# Patient Record
Sex: FEMALE | Race: WHITE
[De-identification: names, ages, dates, MRNs, and addresses within clinical notes are randomized per-mention and may not be internally consistent; named-entity substitution may affect disease eponyms.]

---

## 2017-08-19 ENCOUNTER — HEALTH MAINTENANCE LETTER (OUTPATIENT)
Age: 37
End: 2017-08-19

## 2019-11-06 ENCOUNTER — HEALTH MAINTENANCE LETTER (OUTPATIENT)
Age: 39
End: 2019-11-06

## 2020-04-23 ENCOUNTER — TELEPHONE (OUTPATIENT)
Dept: FAMILY MEDICINE | Facility: CLINIC | Age: 40
End: 2020-04-23

## 2020-04-23 ENCOUNTER — APPOINTMENT (OUTPATIENT)
Dept: LAB | Facility: URGENT CARE | Age: 40
End: 2020-04-23

## 2020-04-23 DIAGNOSIS — Z01.84 IMMUNITY STATUS TESTING: ICD-10-CM

## 2020-04-23 DIAGNOSIS — Z01.84 IMMUNITY STATUS TESTING: Primary | ICD-10-CM

## 2020-04-23 PROCEDURE — 36415 COLL VENOUS BLD VENIPUNCTURE: CPT | Performed by: FAMILY MEDICINE

## 2020-04-23 PROCEDURE — 86769 SARS-COV-2 COVID-19 ANTIBODY: CPT | Performed by: FAMILY MEDICINE

## 2020-04-23 NOTE — TELEPHONE ENCOUNTER
"COVID-19 Antibody Testing     Screening Questions  • Has it been at least ten days after initial onset of symptoms (cough, fever, shortness of breath): yes. Cough/HA, chest tightness  • Has it been at least 10 days after initial known exposure: yes  •   Information for Patient  • If both of the screening questions are answered \"no\" and patient still wishes to proceed with test, advise that this will affect the accuracy of the results of their test.  • The test will be sent to [facility] and we will receive the results in 3-5 days.  • Results will be released to [employer] or mailed to you if you do not have a [employer] account.  • Because research is still ongoing, we will not be able to provide interpretation of your results.   • The cost of the test is $100 and we will bill this to your insurance company. If they do not pay for this study you will be responsible for the cost. You will be asked to sign a waiver at the time of specimen collection.  • Please bring your insurance card and photo identification with you to your lab visit.  + cough/HA, chest tightness the end of March. Antibody testing 4/23/20 [facility]              "

## 2020-04-26 LAB
SARS-COV-2 IGG SERPL IA-ACNC: <1.01
SARS-COV-2 IGG SERPL QL IA: NEGATIVE

## 2020-11-29 ENCOUNTER — HEALTH MAINTENANCE LETTER (OUTPATIENT)
Age: 40
End: 2020-11-29

## 2021-03-03 DIAGNOSIS — Z23 HIGH PRIORITY FOR 2019-NCOV VACCINE: ICD-10-CM

## 2021-03-17 ENCOUNTER — CLINICAL SUPPORT (OUTPATIENT)
Dept: FAMILY MEDICINE | Facility: CLINIC | Age: 41
End: 2021-03-17

## 2021-03-17 DIAGNOSIS — Z23 ENCOUNTER FOR VACCINATION: Primary | ICD-10-CM

## 2021-08-31 RX ORDER — MONTELUKAST SODIUM 10 MG/1
TABLET ORAL
Qty: 90 TABLET | Refills: 3 | OUTPATIENT
Start: 2021-08-31

## 2021-09-19 ENCOUNTER — HEALTH MAINTENANCE LETTER (OUTPATIENT)
Age: 41
End: 2021-09-19

## 2021-11-29 ENCOUNTER — HOSPITAL ENCOUNTER (OUTPATIENT)
Dept: PHYSICAL THERAPY | Facility: HOSPITAL | Age: 41
Setting detail: THERAPIES SERIES
Discharge: STILL ADMITTED OR ADMINISTRATIVE | End: 2021-11-29
Payer: COMMERCIAL

## 2021-11-29 DIAGNOSIS — M25.551 RIGHT HIP PAIN: Primary | ICD-10-CM

## 2021-11-29 PROCEDURE — 97161 PT EVAL LOW COMPLEX 20 MIN: CPT | Mod: GP | Performed by: PHYSICAL THERAPIST

## 2021-11-29 PROCEDURE — 97110 THERAPEUTIC EXERCISES: CPT | Mod: GP | Performed by: PHYSICAL THERAPIST

## 2021-11-29 PROCEDURE — 97140 MANUAL THERAPY 1/> REGIONS: CPT | Mod: GP | Performed by: PHYSICAL THERAPIST

## 2021-11-29 NOTE — INTERDISCIPLINARY/THERAPY
[facility]  [address] [place] [address]  Dept: [phone]    Physical Therapy Initial Evaluation     Patient Name: Cadence Navarrete  Referring Doctor: Aggie Arguello PT  Date of Service: 11/29/2021   Certification Period: through 2/27/22  [id number]    Primary Medical Diagnosis: Right hip pain [M25.551]     Treatment Diagnosis.   Impaired Joint Mobility, Motor Function, Muscle Performance, and Range of Motion Associated with Connective Tissue Dysfunction    Visit Number: 1    Subjective:  Patient is a 41-year-old female presenting with complaints of right hip pain for approximately 2 years.  Patient repeats that she had no specific injury but thinks it is mostly due to overuse.  Patient states that she has been to multiple physical therapists with no improvement in symptoms.  She states she currently has no pain when running or walking but has increased pain and tightness afterwards as well as difficulty sleeping.  Patient has had a negative x-ray but no MRI.  She is currently not doing any specific strengthening or stretching exercises other than some stretching following running.  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  She has not had any pain in this area since.  Patient desires to continue to run and stay active.  She desires to begin physical therapy in order to decrease pain            History reviewed. No pertinent past medical history.      Current Outpatient Medications:   •  albuterol HFA (PROVENTIL HFA;VENTOLIN HFA) 90 mcg/actuation inhaler, Inhale 2 puffs every 4 hours by inhalation route as needed., Disp: , Rfl:     Aspirin and Nsaids (non-steroidal anti-inflammatory drug)    Social History     Socioeconomic History   • Marital status:      Spouse name: Not on file   • Number of children: Not on file   • Years of education: Not on file   • Highest  education level: Not on file   Occupational History   • Not on file   Tobacco Use   • Smoking status: Not on file   • Smokeless tobacco: Not on file   Substance and Sexual Activity   • Alcohol use: Not on file   • Drug use: Not on file   • Sexual activity: Not on file   Other Topics Concern   • Not on file   Social History Narrative   • Not on file     Social Determinants of Health     Financial Resource Strain: Not on file   Food Insecurity: Not on file   Transportation Needs: Not on file   Physical Activity: Not on file   Stress: Not on file   Social Connections: Not on file   Intimate Partner Violence: Not on file   Housing Stability: Not on file               Moreau Fall Risk  History of Falling: No  Secondary Diagnosis: No  Ambulatory Aids: None/bedrest/nurse assist  Intravenous Therapy/Heparin/Saline Lock: No  Gait/Transferring: Normal/bedrest/wheelchair  Mental Status: Oriented to own ability  Moreau Fall Risk Score: 0  Moreau Fall Risk Score: 0  Fall Risk Interventions: none    Pain:  Pain Assessment Scale  Pain Scale: 0-10  0-10 Pain Score: 0 - No pain    Activities limited by Patient's complaint:   1.  Running  2.  Prolonged hiking      Objective:     Observation:    Patient is a healthy-appearing 41-year-old female no apparent distress    Gait:   Patient ambulates with no significant gait deviations.  However, she does have decreased lumbar/pelvic rotation    Posture:    Increased lumbar lordosis    ROM:   Standing lumbar active range of motion:  Flexion: To ankles without pain in  Extension: Within normal limits without pain  Side bending: Within normal limits, slight discomfort with left side bending  Rotation: Decreased left rotation greater than right without pain    Strength Testing:    Hip flexion: 4+/5 bilaterally  Hip abduction: 4+/5 bilaterally  Hip internal rotation: Left 5/5 right 4/5  Hip external rotation: Left 5/5 right 4+/5  Hamstrings: 5/5 bilaterally  Quadriceps: 5/5 bilaterally    Joint  Mobility:   Decreased right hip PA and AP joint mobility  Decreased L4-L5 rotational facet joint mobility    Flexibility:   Decreased bilateral quad flexibility right greater than left  Decreased bilateral hip flexor flexibility right greater than left    Special Tests:   SI joint cluster testing:  Heather test, SI joint compression test, Gaenslen's test, sacral thrust all negative bilaterally  Positive FADIR right  Negative scour test bilaterally    Neurological:   Light touch sensation intact and symmetrical    Palpation:    Significant tenderness to palpation of right gluteus medius, minimus, piriformis, QL, lumbar paraspinals, TFL, ITB    Pelvic position: Right anterior innominate rotation left on left sacral torsion    Functional Assessment:    Single leg stance: Able to perform 20 seconds bilaterally without hip drop or loss of balance      Education:    Patient was educated on physical therapy plan of care and goals agreed upon them  Patient was given home exercise program via [employer] including:  Access Code: [email or web address]  URL: [email or web address]  Date: 11/29/2021  Prepared by: Aggie Arguello    Exercises  Supine Lower Trunk Rotation - 1 x daily - 7 x weekly - 2 sets - 10 reps  Sidelying Thoracic Rotation with Open Book - 1 x daily - 7 x weekly - 2 sets - 10 reps  Child's Pose with Sidebending - 1 x daily - 7 x weekly - 2 sets - 30 sec hold  Supine Pelvic Floor Stretch - 1 x daily - 7 x weekly - 2 sets - 30 sec hold  Standing Quadriceps Stretch - 1 x daily - 7 x weekly - 2 sets - 30sec hold  Hip Flexor Stretch with Chair - 1 x daily - 7 x weekly - 2 sets - 10 reps - 30 sec hold  Standing Lumbar Rotation Stretch - 1 x daily - 7 x weekly - 2 sets - 10 reps  Seated Piriformis Stretch - 2 x daily - 7 x weekly - 2 sets - 30 hold             Time Calculation  Start Time: 0815  Stop Time: 0900  Time Calculation (min): 45 min     Therapeutic Interventions (Time Spent in Minutes)  Manual Therapy:  10  Therapeutic Exercise: 15     PT Untimed Charges - Quick List (Time Spent in Minutes)  PT Eval Low Complexity: 20       Initial Treatment:   Evaluation  Manual therapy:  MET for correction of R anterior innominate rotation and L on L sacral torsion  Therapeutic exercise:  Demonstrated and performed HEP as seen above.   Gave handout for [facility]    Rehab Goals/Potential/Assessment/Plan:  Patient is a 41-year-old female presenting with signs and symptoms of right hip pain associated with limited lumbar and hip mobility as well as increased muscle tension and decreased lower extremity flexibility and strength.  Patient will benefit from skilled therapy in order to improve these deficits and return to prior level of function without pain.    Short-Term Goals:      Short Term PT Goal 2: Patient will be able to hike desired distance without increase in hip pain within 4 weeks.      Long-Term Goals:    Long Term PT Goal 1: Patient will demonstrate [place] and compliance with home exercise program within 8-12 weeks.         Long Term PT Goal 2: Patient will be able to run desired amount without increase in hip pain within 12 weeks.         Long Term PT Goal 3: Patient will demonstrate 5/5 manual muscle tests in bilateral lower extremities to improve hip stability and strength within 12 weeks             Prognosis  Assessment: Decreased endurance,Decreased LE strength,Decreased LE ROM  Prognosis: Good    Plan  Treatment Interventions: Therapeutic activities,Therapeutic exercises,Manual therapy,Modalities,Neuromuscular re-education,Pain education/TNE,Balance training  PT Frequency: 1-2x/wk  Treatment Duration : 12 weeks        Thank you for allowing us to share in the care of this patient.  If you have any questions, recommendations, or further concerns regarding this patient, please feel free to contact me.  Signed by: Aggie Arguello, PT  11/29/2021  4:09 PM    * I have reviewed the plan of care and certify a  continuing need for medically necessary services

## 2021-12-06 ENCOUNTER — HOSPITAL ENCOUNTER (OUTPATIENT)
Dept: PHYSICAL THERAPY | Facility: HOSPITAL | Age: 41
Setting detail: THERAPIES SERIES
Discharge: STILL ADMITTED OR ADMINISTRATIVE | End: 2021-12-06
Payer: COMMERCIAL

## 2021-12-06 PROCEDURE — 20560 NDL INSJ W/O NJX 1 OR 2 MUSC: CPT | Performed by: PHYSICAL THERAPIST

## 2021-12-06 PROCEDURE — 97032 APPL MODALITY 1+ESTIM EA 15: CPT | Mod: GP | Performed by: PHYSICAL THERAPIST

## 2021-12-06 PROCEDURE — 97110 THERAPEUTIC EXERCISES: CPT | Mod: GP | Performed by: PHYSICAL THERAPIST

## 2021-12-06 NOTE — INTERDISCIPLINARY/THERAPY
[address] [facility]  [place] [id number] [phone]      Physical Therapy Daily Treatment Note       Date of Service: 12/06/21    Patient Name: Cadence Navarrete  Referring Provider: Aggie Arguello, PT  Visit Number: 2           SUBJECTIVE:  Pt states that she was feeling pretty good but started having pain after she ran.  Her pain was mostly in her right glute and hip area.      Has patient fallen since last visit? No  FALL RISK Interventions:none    Have there been any changes in medications? No      Pain:   Pain Assessment Scale  Pain Scale: 0-10  0-10 Pain Score: 3  Pain Type: Acute pain  Pain Location: Hip  Pain Orientation: Right  Pain Descriptors: Aching  Pain Frequency: Intermittent  .  OBJECTIVE:    Verbal consent was obtained from the patient.  Written consent was obtained from the patient.  Risks, benefits, and alternatives were discussed.  The patient states understanding of the procedure being performed.  Patient ID confirmed verbally.      Dry Needling x 15min    Position: prone  Needles: 3; lumbar multifidus and gluteus medius  Technique: Direct    Electrical Stimulation - Attended x 10 min  Point Stimulator:  Intra-muscular, Inter-muscular    Manual therapy:  - STM in prone to right lumbar paraspinals, and right glute musculature    Therapeutic exercises:  Reviewed and performed HEP below:    Access Code: [email or web address]    Exercises  Supine Lower Trunk Rotation - 1 x daily - 7 x weekly - 2 sets - 10 reps  Sidelying Thoracic Rotation with Open Book - 1 x daily - 7 x weekly - 2 sets - 10 reps  Child's Pose with Sidebending - 1 x daily - 7 x weekly - 2 sets - 30 sec hold  Supine Pelvic Floor Stretch - 1 x daily - 7 x weekly - 2 sets - 30 sec hold  Standing Quadriceps Stretch - 1 x daily - 7 x weekly - 2 sets - 30sec hold  Hip Flexor Stretch with Chair - 1 x daily - 7 x weekly - 2 sets - 10 reps - 30 sec hold  Standing Lumbar Rotation Stretch - 1 x daily - 7 x weekly - 2 sets - 10 reps  Seated  Piriformis Stretch - 2 x daily - 7 x weekly - 2 sets - 30 hold     Time Calculation  Start Time: 0935  Stop Time: 1015  Time Calculation (min): 40 min    ASSESSMENT:  Pt had trigger points in her right lumbar and glute musculature.  She is performing her exercises and feels good after performing them but continues to have right hip pain after running.      PLAN FOR NEXT TREAMENT SESSION:  - Begin hip/core strengthening exercises.   - Continue manual therapy and/or dry needling to decrease muscle tension and trigger points.     Thank you for allowing us to share in the care of this patient. If you have any questions, recommendations, or further concerns regarding this patient, please feel free to contact me at [address] [facility] [address] [phone]  .  Signed by: Aggie Arguello, PT  12/6/2021  10:13 AM

## 2021-12-17 ENCOUNTER — HOSPITAL ENCOUNTER (OUTPATIENT)
Dept: PHYSICAL THERAPY | Facility: HOSPITAL | Age: 41
Setting detail: THERAPIES SERIES
Discharge: STILL ADMITTED OR ADMINISTRATIVE | End: 2021-12-17
Payer: COMMERCIAL

## 2021-12-17 PROCEDURE — 97112 NEUROMUSCULAR REEDUCATION: CPT | Mod: GP | Performed by: PHYSICAL THERAPIST

## 2021-12-17 PROCEDURE — 97032 APPL MODALITY 1+ESTIM EA 15: CPT | Mod: GP | Performed by: PHYSICAL THERAPIST

## 2021-12-17 NOTE — INTERDISCIPLINARY/THERAPY
[address] [facility]  [place] [facility] [id number] [phone]      Physical Therapy Daily Treatment Note       Date of Service: 12/17/21    Patient Name: Cadence Navarrete  Referring Provider: Aggie Arguello, PT  Visit Number: 3           SUBJECTIVE:  Pt states that she didn't do her exercises while she was traveling     Has patient fallen since last visit? No  FALL RISK Interventions:none    Have there been any changes in medications? No      Pain:   Pain Assessment Scale  Pain Scale: 0-10  0-10 Pain Score: 2  Pain Type: Acute pain  Pain Location: Hip  Pain Orientation: Right  Pain Descriptors: Aching  Pain Frequency: Intermittent  .  OBJECTIVE:    Verbal consent was obtained from the patient.  Written consent was obtained from the patient.  Risks, benefits, and alternatives were discussed.  The patient states understanding of the procedure being performed.  Patient ID confirmed verbally.      Dry Needling x 15min    Position: prone  Needles: 4; lumbar multifidus and gluteus medius  Technique: Direct    Electrical Stimulation - Attended x 10 min  Point Stimulator:  Intra-muscular, Inter-muscular    Manual therapy:  - STM in prone to right lumbar paraspinals, and right glute musculature    Therapeutic exercises:  Reviewed and performed HEP below:    Neuromuscular re-education:  - Diaphragm breathing for pelvic floor muscle relaxation:  - Diaphragm breathing with pelvic floor and TA activation (pelvic brace)  - supine pelvic brace with fall out (YTB)  - supine pelvic brace with march (YTB)    Access Code: [email or web address]  URL: [email or web address]  Date: 12/17/2021  Prepared by: Aggie Arguello    Exercises  Supine Lower Trunk Rotation - 1 x daily - 7 x weekly - 2 sets - 10 reps  Sidelying Thoracic Rotation with Open Book - 1 x daily - 7 x weekly - 2 sets - 10 reps  Child's Pose with Sidebending - 1 x daily - 7 x weekly - 2 sets - 30 sec hold  Supine Pelvic Floor Stretch - 1 x daily - 7 x weekly - 2 sets - 30  sec hold  Standing Quadriceps Stretch - 1 x daily - 7 x weekly - 2 sets - 30sec hold  Hip Flexor Stretch with Chair - 1 x daily - 7 x weekly - 2 sets - 10 reps - 30 sec hold  Standing Lumbar Rotation Stretch - 1 x daily - 7 x weekly - 2 sets - 10 reps  Seated Piriformis Stretch - 2 x daily - 7 x weekly - 2 sets - 30 hold  Supine March - 1 x daily - 7 x weekly - 10 reps - 2 sets  Hooklying Single Leg Bent Knee Fallouts with Resistance - 1 x daily - 7 x weekly - 10 reps - 2 sets  Bridge with Hip Abduction and Resistance - Ground Touches - 1 x daily - 7 x weekly - 10 reps - 2 sets     Time Calculation  Start Time: 0735  Stop Time: 0815  Time Calculation (min): 40 min    ASSESSMENT:  Pt had very good twitch response today with dry needling to her lumbar and glute musculature.  Pt verbalized feeling her deep core muscles during the electrical stimulation but no significant pain.  She had good understanding of deep core activation following needling today and will benefit from performing initial deep core exercises to improve pelvic, hip and spinal mobility.        PLAN FOR NEXT TREAMENT SESSION:  - Progress hip/core strengthening exercises.   - Continue manual therapy and/or dry needling to decrease muscle tension and trigger points.     Thank you for allowing us to share in the care of this patient. If you have any questions, recommendations, or further concerns regarding this patient, please feel free to contact me at [address] [facility] [address] [phone]  .  Signed by: Aggie Arguello, PT  12/17/2021  8:24 AM

## 2021-12-20 ENCOUNTER — OFFICE VISIT (OUTPATIENT)
Dept: FAMILY MEDICINE | Facility: CLINIC | Age: 41
End: 2021-12-20
Payer: COMMERCIAL

## 2021-12-20 VITALS
OXYGEN SATURATION: 96 % | RESPIRATION RATE: 18 BRPM | SYSTOLIC BLOOD PRESSURE: 126 MMHG | WEIGHT: 184 LBS | HEIGHT: 64 IN | BODY MASS INDEX: 31.41 KG/M2 | TEMPERATURE: 99.1 F | HEART RATE: 121 BPM | DIASTOLIC BLOOD PRESSURE: 80 MMHG

## 2021-12-20 DIAGNOSIS — R05.9 COUGH: Primary | ICD-10-CM

## 2021-12-20 DIAGNOSIS — J10.1 INFLUENZA A: ICD-10-CM

## 2021-12-20 LAB
FLUAV RNA NPH QL NAA+NON-PROBE: POSITIVE
FLUBV RNA NPH QL NAA+NON-PROBE: NEGATIVE
GP B STREP AG SPEC QL: NEGATIVE
RSV RNA NPH QL NAA+NON-PROBE: NEGATIVE
SARS-COV-2 RNA RESP QL NAA+PROBE: NEGATIVE

## 2021-12-20 PROCEDURE — 87637 SARSCOV2&INF A&B&RSV AMP PRB: CPT | Performed by: FAMILY MEDICINE

## 2021-12-20 PROCEDURE — 87070 CULTURE OTHR SPECIMN AEROBIC: CPT | Performed by: FAMILY MEDICINE

## 2021-12-20 PROCEDURE — 99213 OFFICE O/P EST LOW 20 MIN: CPT

## 2021-12-20 PROCEDURE — 87880 STREP A ASSAY W/OPTIC: CPT | Mod: QW | Performed by: FAMILY MEDICINE

## 2021-12-20 RX ORDER — LORATADINE 10 MG/1
TABLET ORAL EVERY 24 HOURS
COMMUNITY

## 2021-12-20 RX ORDER — OSELTAMIVIR PHOSPHATE 75 MG/1
75 CAPSULE ORAL 2 TIMES DAILY
Qty: 10 CAPSULE | Refills: 0 | Status: SHIPPED | OUTPATIENT
Start: 2021-12-20 | End: 2021-12-25

## 2021-12-20 RX ORDER — MONTELUKAST SODIUM 10 MG/1
TABLET ORAL
COMMUNITY

## 2021-12-20 ASSESSMENT — ENCOUNTER SYMPTOMS
DIFFICULTY URINATING: 0
MYALGIAS: 1
ABDOMINAL PAIN: 1
BLOOD IN STOOL: 0
HEADACHES: 1
ARTHRALGIAS: 1
HYPERACTIVE: 0
SLEEP DISTURBANCE: 0
NAUSEA: 0
COUGH: 1
SINUS PAIN: 1
DIZZINESS: 0
DIARRHEA: 0
NERVOUS/ANXIOUS: 0
CONSTIPATION: 0
WHEEZING: 0
SHORTNESS OF BREATH: 0
SORE THROAT: 1
LIGHT-HEADEDNESS: 0
VOMITING: 0
CONFUSION: 0
RHINORRHEA: 1

## 2021-12-20 ASSESSMENT — PAIN SCALES - GENERAL: PAINLEVEL: 6

## 2021-12-20 NOTE — PROGRESS NOTES
"OUTPATIENT PROGRESS NOTE    Subjective   HPI: Cadence Navarrete  is a 41 y.o. female who presents symptoms started Sunday with sinus pain and body aches. Today noticed chills and fever 101 F at 1300 and took tylenol. Patient has allergy to ibuprofen and aspirin. Patient notes that she has not had any significant abdominal pain, nausea, vomiting, diarrhea, constipation.      I have reviewed the patients active problem list, medication list, allergies, past medical history, past surgical history and social history and updated pertinent portions of the chart.    Review of Systems   HENT: Positive for rhinorrhea, sinus pain and sore throat. Negative for ear discharge and ear pain.    Respiratory: Positive for cough. Negative for shortness of breath and wheezing.    Gastrointestinal: Positive for abdominal pain. Negative for blood in stool, constipation, diarrhea, nausea and vomiting.   Genitourinary: Negative for decreased urine volume and difficulty urinating.   Musculoskeletal: Positive for arthralgias and myalgias.   Neurological: Positive for headaches. Negative for dizziness and light-headedness.   Psychiatric/Behavioral: Negative for confusion and sleep disturbance. The patient is not nervous/anxious and is not hyperactive.        Objective   /80 (BP Location: Right arm, Patient Position: Sitting, Cuff Size: Regular Adult)   Pulse 121   Temp 37.3 °C (99.1 °F) (Temporal)   Resp 18   Ht 1.626 m (5' 4\")   Wt 83.5 kg (184 lb)   SpO2 96%   BMI 31.58 kg/m²   EXAM:  Physical Exam  Constitutional:       Appearance: Normal appearance.   HENT:      Right Ear: Tympanic membrane normal.      Left Ear: Tympanic membrane normal.      Mouth/Throat:      Mouth: Mucous membranes are moist.      Pharynx: Posterior oropharyngeal erythema present. No oropharyngeal exudate.   Eyes:      General: No scleral icterus.        Right eye: No discharge.         Left eye: No discharge.      Extraocular Movements: Extraocular " movements intact.      Conjunctiva/sclera: Conjunctivae normal.      Pupils: Pupils are equal, round, and reactive to light.   Cardiovascular:      Rate and Rhythm: Normal rate and regular rhythm.      Heart sounds: No murmur heard.    No friction rub. No gallop.   Pulmonary:      Effort: No respiratory distress.      Breath sounds: No stridor. No wheezing, rhonchi or rales.   Abdominal:      General: Bowel sounds are normal. There is no distension.      Palpations: Abdomen is soft. There is no mass.      Tenderness: There is no abdominal tenderness. There is no guarding or rebound.      Hernia: No hernia is present.   Skin:     Coloration: Skin is not jaundiced.   Neurological:      Mental Status: She is alert.           A/P:   Cadence was seen today for fever.    Diagnoses and all orders for this visit:    Cough  -     SARS/INFLUENZA/RSV QUADRUPLEX PCR  -     Rapid Strep Screen Swab  -     Throat culture    Influenza A    Patient is a 41-year-old female presenting with symptoms of myalgias, fever, rhinorrhea.  Patient was found to be positive for influenza A.  Due to symptom onset less than 48 hours prior patient was offered Tamiflu which she accepted.  Patient was advised to keep up with fever control with Tylenol and maintain hydration.  Patient advised to contact clinic if fever becomes uncontrolled or if symptoms are significantly worse including respiratory symptoms.  Patient record does not show that she has had flu vaccine this year.  Patient advised that she can return to normal activities 48 hours after last symptoms.  Influenza A infection   Symptom onset within past 48 hours   Tamiflu 75 mg twice daily for ordered 5 days   Symptomatic support including fever control with Tylenol   Educated on importance of hydration   Advised to seek medical attention if worsening symptoms   Patient to return to normal activities 48 hours after last symptoms  Jose Thurman DO  12/20/213:45 PM

## 2021-12-23 LAB — BACTERIA THROAT CULT: NORMAL

## 2022-01-05 ENCOUNTER — HOSPITAL ENCOUNTER (OUTPATIENT)
Dept: PHYSICAL THERAPY | Facility: HOSPITAL | Age: 42
Setting detail: THERAPIES SERIES
Discharge: STILL ADMITTED OR ADMINISTRATIVE | End: 2022-01-05
Payer: COMMERCIAL

## 2022-01-05 PROCEDURE — 97112 NEUROMUSCULAR REEDUCATION: CPT | Mod: GP | Performed by: PHYSICAL THERAPIST

## 2022-01-05 NOTE — INTERDISCIPLINARY/THERAPY
[address] [place] [id number] [phone]      Physical Therapy Daily Treatment Note       Date of Service: 01/05/22    Patient Name: Cadence Navarrete  Referring Provider: Aggie Arguello, PT  Visit Number: 4           SUBJECTIVE:  Pt states that she has had the flu the last week and has just been very sick.  She did have some instsances with back spasms.  She has resumed her exercises this week and they are going good.  The pain in her hip and back have been less but she is feeling it in the front of her right hip.      Has patient fallen since last visit? No  FALL RISK Interventions:none    Have there been any changes in medications? No      Pain:   Pain Assessment Scale  Pain Scale: 0-10  0-10 Pain Score: 2  Pain Type: Acute pain  Pain Location: Hip  Pain Orientation: Right  Pain Descriptors: Aching  Pain Frequency: Intermittent  .  OBJECTIVE:    Verbal consent was obtained from the patient.  Written consent was obtained from the patient.  Risks, benefits, and alternatives were discussed.  The patient states understanding of the procedure being performed.  Patient ID confirmed verbally.      Dry Needling x 10min    Position: prone  Needles: 3; lumbar multifidus (bilat and gluteus medius (right)  Technique: Direct    Electrical Stimulation - Attended x 10 min  Point Stimulator:  Intra-muscular, Inter-muscular    Manual therapy:  - STM in prone to right lumbar paraspinals, and right glute musculature    Therapeutic exercises x 15:  Reviewed and performed HEP below:  - added bridge with march  - kneeling hip flexor stretch  - pigeon pose    Neuromuscular re-education:  - Diaphragm breathing for pelvic floor muscle relaxation:  - Diaphragm breathing with pelvic floor and TA activation (pelvic brace)  - supine pelvic brace with fall out (GTB)  - supine pelvic brace with march (GTB)  - Bridge with march (GTB)         Time Calculation  Start Time: 0735  Stop Time: 0815  Time Calculation (min): 40  min    ASSESSMENT:  Pt continues to have benefit from dry needling.  She is having decreased hip pain overall and is appropriate to begin progressive strengthening of her core and hip musculature.       PLAN FOR NEXT TREAMENT SESSION:  - Progress hip/core strengthening exercises.   - Continue manual therapy and/or dry needling to decrease muscle tension and trigger points.     Thank you for allowing us to share in the care of this patient. If you have any questions, recommendations, or further concerns regarding this patient, please feel free to contact me at [address] [facility] [address] [phone]  .  Signed by: Aggie Arguello, PT  1/5/2022  10:57 AM

## 2022-01-09 ENCOUNTER — HEALTH MAINTENANCE LETTER (OUTPATIENT)
Age: 42
End: 2022-01-09

## 2022-01-12 ENCOUNTER — HOSPITAL ENCOUNTER (OUTPATIENT)
Dept: PHYSICAL THERAPY | Facility: HOSPITAL | Age: 42
Setting detail: THERAPIES SERIES
Discharge: STILL ADMITTED OR ADMINISTRATIVE | End: 2022-01-12
Payer: COMMERCIAL

## 2022-01-12 PROCEDURE — 97110 THERAPEUTIC EXERCISES: CPT | Mod: GP | Performed by: PHYSICAL THERAPIST

## 2022-01-12 NOTE — INTERDISCIPLINARY/THERAPY
[address] [facility]  [place] [facility] [id number] [phone]      Physical Therapy Daily Treatment Note       Date of Service: 01/12/22    Patient Name: Cadence Navarrete  Referring Provider: Aggie Arguello, PT  Visit Number: 5           SUBJECTIVE:  Pt states that she was very sore from the dry needling last session but overall is feeling very good.  She hasn't really had any pain in her right hip but also hasn't been running.     Has patient fallen since last visit? No  FALL RISK Interventions:none    Have there been any changes in medications? No      Pain:   Pain Assessment Scale  Pain Scale: 0-10  0-10 Pain Score: 2  Pain Type: Acute pain  Pain Location: Hip  Pain Orientation: Right  Pain Descriptors: Aching  Pain Frequency: Intermittent  .  OBJECTIVE:        Therapeutic exercises x 15:  - Upright Bike x 5 min 50W  - Side steps 2x10  - SL hip hinge 2x10  - quadruped fire hydrant 2x10  - Squats with band 2x10  - kneeling hip flexor stretch  - pigeon pose  - LTR  - s/l thoracic rotation  - seated piriformis stretch    Access Code: [email or web address]  URL: [email or web address]  Date: 01/12/2022  Prepared by: Aggie Arguello    Exercises  Side Stepping with Resistance at Ankles - 1 x daily - 7 x weekly - 2 sets - 10 reps  Squat with Chair Touch and Resistance Loop - 1 x daily - 7 x weekly - 2 sets - 10 reps  Bridge with Hip Abduction and Resistance - Ground Touches - 1 x daily - 7 x weekly - 10 reps - 2 sets  Quadruped Hip Abduction with Resistance Loop - 1 x daily - 7 x weekly - 2 sets - 10 reps  Marching Bridge - 1 x daily - 7 x weekly - 2 sets - 10 reps  Walk with Forward T - 1 x daily - 7 x weekly - 2 sets - 10 reps  Supine Lower Trunk Rotation - 1 x daily - 7 x weekly - 2 sets - 10 reps  Sidelying Thoracic Rotation with Open Book - 1 x daily - 7 x weekly - 2 sets - 10 reps  Child's Pose with Sidebending - 1 x daily - 7 x weekly - 2 sets - 30 sec hold  Half Kneeling Hip Flexor Stretch - 1 x daily - 7 x  weekly - 2 sets  [place] Pose - 1 x daily - 7 x weekly - 2 sets  Supine Pelvic Floor Stretch - 1 x daily - 7 x weekly - 2 sets - 30 sec hold  Standing Quadriceps Stretch - 1 x daily - 7 x weekly - 2 sets - 30sec hold  Seated Piriformis Stretch - 2 x daily - 7 x weekly - 2 sets - 30 hold       Time Calculation  Start Time: 0730  Stop Time: 0815  Time Calculation (min): 45 min    ASSESSMENT:  Pt demonstrated weakness in her hip stabilizers with exercises today, especially on the right.  She will benefit from combination of hip/core strengthening in order to normalize stability and strength and return to prior level of function.       PLAN FOR NEXT TREAMENT SESSION:  - Progress hip/core strengthening exercises.   - Continue manual therapy and/or dry needling to decrease muscle tension and trigger points.     Thank you for allowing us to share in the care of this patient. If you have any questions, recommendations, or further concerns regarding this patient, please feel free to contact me at [address] [facility] [address] [phone]  .  Signed by: Aggie Arguello, PT  1/12/2022  8:18 AM

## 2022-01-27 ENCOUNTER — HOSPITAL ENCOUNTER (OUTPATIENT)
Dept: PHYSICAL THERAPY | Facility: HOSPITAL | Age: 42
Setting detail: THERAPIES SERIES
Discharge: STILL ADMITTED OR ADMINISTRATIVE | End: 2022-01-27
Payer: COMMERCIAL

## 2022-01-27 ENCOUNTER — DOCUMENTATION (OUTPATIENT)
Dept: PHYSICAL THERAPY | Facility: CLINIC | Age: 42
End: 2022-01-27

## 2022-01-27 PROCEDURE — 97110 THERAPEUTIC EXERCISES: CPT | Mod: GP | Performed by: PHYSICAL THERAPIST

## 2022-01-27 PROCEDURE — 97140 MANUAL THERAPY 1/> REGIONS: CPT | Mod: GP | Performed by: PHYSICAL THERAPIST

## 2022-01-27 NOTE — INTERDISCIPLINARY/THERAPY
[address] [place] [id number] [phone]      Physical Therapy Daily Treatment Note       Date of Service: 01/27/22    Patient Name: Cadence Navarrete  Referring Provider: Aggie Arguello, PT  Visit Number: 6           SUBJECTIVE:  Pt states that she has had very minimal pain in her hip.  She occasionally has pain in the front of her right hip.  She has been doing the exercises and does have some soreness.    Has patient fallen since last visit? No  FALL RISK Interventions:none    Have there been any changes in medications? No      Pain:   Pain Assessment Scale  Pain Scale: 0-10  0-10 Pain Score: 0 - No pain  .  OBJECTIVE:    Manual therapy:  - STM in sidelying to right psoas, obliques, iliacus, glut med    Therapeutic exercises:  *reviewed HEP  - Side steps   - SL hip hinge   - quadruped fire hydrant   - Squats with band   - SL sit to stand from chair  - kneeling hip flexor stretch - with twist and overhead   - pigeon pose  - seated piriformis stretch    Access Code: [email or web address]  URL: [email or web address]  Date: 01/12/2022  Prepared by: Aggie Arguello    Exercises  Side Stepping with Resistance at Ankles - 1 x daily - 7 x weekly - 2 sets - 10 reps  Squat with Chair Touch and Resistance Loop - 1 x daily - 7 x weekly - 2 sets - 10 reps  Bridge with Hip Abduction and Resistance - Ground Touches - 1 x daily - 7 x weekly - 10 reps - 2 sets  Quadruped Hip Abduction with Resistance Loop - 1 x daily - 7 x weekly - 2 sets - 10 reps  Marching Bridge - 1 x daily - 7 x weekly - 2 sets - 10 reps  Walk with Forward T - 1 x daily - 7 x weekly - 2 sets - 10 reps  Supine Lower Trunk Rotation - 1 x daily - 7 x weekly - 2 sets - 10 reps  Sidelying Thoracic Rotation with Open Book - 1 x daily - 7 x weekly - 2 sets - 10 reps  Child's Pose with Sidebending - 1 x daily - 7 x weekly - 2 sets - 30 sec hold  Half Kneeling Hip Flexor Stretch - 1 x daily - 7 x weekly - 2 sets  [place] Pose - 1 x daily - 7 x  weekly - 2 sets  Supine Pelvic Floor Stretch - 1 x daily - 7 x weekly - 2 sets - 30 sec hold  Standing Quadriceps Stretch - 1 x daily - 7 x weekly - 2 sets - 30sec hold  Seated Piriformis Stretch - 2 x daily - 7 x weekly - 2 sets - 30 hold       Time Calculation  Start Time: 0740  Stop Time: 0815  Time Calculation (min): 35 min    ASSESSMENT:  Pt continues to have increased muscle tension in her right anterior and lateral glute/hip musculature.  She continues to demonstrate instability and weakness in her core and hip stabilizers marc on the right.  This is likely contributing to her pain and muscle imbalances.  She will benefit from performing home exercises and returning if pain persists or does not continue to improve.       PLAN FOR NEXT TREAMENT SESSION:  - follow-up in 1 month regarding HEP    Thank you for allowing us to share in the care of this patient. If you have any questions, recommendations, or further concerns regarding this patient, please feel free to contact me at [address] [facility] [address] [phone]  .  Signed by: Aggie Arguello, PT  1/27/2022  11:35 AM

## 2022-08-02 NOTE — PATIENT INSTRUCTIONS
"Avoid sunlight between the hours of 10 am and 2 pm, wear a broad spectrum, water resistant sunscreen with SPF of 30-50 year round. Reapply sunscreen every 2 hours and after exercising or swimming. Wearing a 6\" broad brimmed hat, a lip sunblock of SPF of 30-50, and sun protective clothing is also suggested year round.   Recommended sunscreens include Neutrogena Ultra Sheer Dry Touch SPF 50 or higher, Neutrogena Sheer Zinc Dry Touch SPF 50, and Vanicream Sport.  ---------------------------------------------------------------------  Monthly self-examination of your skin is important. Should any lesions, including moles, change in size, shape, color, itch, bleed or burn, contact our office for sooner evaluation.  ----------------------------------------------------------------------  **We are always looking for opportunities to improve your care and patient experience. You will be receiving a survey about your care via text or e-mail. If you come across questions in the survey that are not applicable to your visit, please leave these questions blank. Your satisfaction is important to us so please be honest. Your identity is kept confidential. Thank you!**     Cadence was seen today for skin problem.    Diagnoses and all orders for this visit:    Nipple dermatitis  -     Ambulatory referral to Dermatology    Wash the breasts with water only and moisturize the breasts after cleansing.  If you break out again, please send Dr. Elkins a MyChart message so we can get you in to see her.  "

## 2022-08-02 NOTE — PROGRESS NOTES
Subjective --Consultation from Ginna Hurd MD  New Dermatology Skin examination  Cadence Navarrete is a 42 y.o. female who presents for a consult for  nipple dermatitis  . Lesions of concern include: red, itchy and sensitive aereolas and nipples for about 6 months. Patient states she is clear now. She tried some prescription steroid ointment and it stopped the intense itching, but the redness and sensitivity persisted.  Patient does have a personal history of allergies and eczema. She has not had patch testing.     Patient does not endorse a history of tanning bed use.  Patient skin history: Negative for personal history of non melanoma skin cancer, Negative for actinic keratosis, Negative personal history of melanoma,  Negative family history of melanoma.            Review of Systems  Review of Systems   Constitutional:  Negative for fatigue and fever.   Skin:  Negative for rash.   Allergic/Immunologic: Positive for environmental allergies. Negative for immunocompromised state.   Hematological:  Does not bruise/bleed easily.   All other systems reviewed and are negative.      History reviewed. No pertinent past medical history.       History reviewed. No pertinent surgical history.      family history is not on file.      Medications  Current Outpatient Medications on File Prior to Visit   Medication Sig Dispense Refill    loratadine (CLARITIN) 10 mg tablet daily      montelukast (SINGULAIR) 10 mg tablet 1 TABLET IN THE EVENING ONCE A DAY AT BEDTIME ORALLY 90 DAYS      multivit-min/iron/folic acid/K (ADULTS MULTIVITAMIN ORAL) daily      albuterol HFA (PROVENTIL HFA;VENTOLIN HFA) 90 mcg/actuation inhaler Inhale 2 puffs every 4 hours by inhalation route as needed.       No current facility-administered medications on file prior to visit.       Allergies  Aspirin, Nsaids (non-steroidal anti-inflammatory drug), and Sulfa (sulfonamide antibiotics)      Physical Examination    Vital Signs There were no vitals taken for  this visit.      Physical Exam Findings:   Constitutional: General Appearance: healthy-appearing, well-nourished, and well-developed. Level of Distress: NAD. Ambulation: ambulating normally.  Psychiatric: Insight: good judgement. Mental Status: normal mood and affect and active and alert. Orientation: to time, place, and person. Memory: recent memory normal and remote memory normal.  Head: normocephalic and atraumatic.  Eyes: Lids and Conjunctivae: no discharge or pallor and non-injected. Lens: clear. Sclerae: non-icteric.  Neurologic: Gait and Station: normal gait and station. Cranial Nerves: grossly intact. Coordination and Cerebellum: no tremor.      A focused skin examination was performed including the breasts    Bilateral breasts: Both breasts are clear       Cadence was seen today for skin problem.    Diagnoses and all orders for this visit:    Nipple dermatitis  -     Ambulatory referral to Dermatology   - Discussed that fact that area is clear today is a good sign and makes any type of inflammatory breast cancer or Paget disease much lower on the differential. Discussed given history of significant itch and prior history of eczema, I suspect this to be related to eczema or allergic contact dermatitis. Recommend patient call to schedule when she is flaring and we will try to get her in right away for in person evaluation and possible biopsy.   - Discussed recommendations for gentle skin care.  I recommend that the patient avoid use of any scented lotions, creams, essential oils, soaps or other skin care products.  I recommend the patient use warm water rather than hot, when bathing and use a gentle nonsoap cleanser such as Lever 2000 or Dove bar soap.  Instructed the patient to apply bland thick moisturizer, such as CeraVe or Vanicream, to the whole body following bathing.        Return if nipple dermatitis flares for urgent follow up while flaring.

## 2022-08-30 ENCOUNTER — CONSULT (OUTPATIENT)
Dept: DERMATOLOGY | Facility: CLINIC | Age: 42
End: 2022-08-30
Payer: COMMERCIAL

## 2022-08-30 DIAGNOSIS — L30.9 NIPPLE DERMATITIS: ICD-10-CM

## 2022-08-30 PROCEDURE — 99202 OFFICE O/P NEW SF 15 MIN: CPT | Performed by: STUDENT IN AN ORGANIZED HEALTH CARE EDUCATION/TRAINING PROGRAM

## 2022-08-30 ASSESSMENT — ENCOUNTER SYMPTOMS
FEVER: 0
FATIGUE: 0
BRUISES/BLEEDS EASILY: 0

## 2022-08-30 ASSESSMENT — PAIN SCALES - GENERAL: PAINLEVEL: 0-NO PAIN

## 2022-09-23 NOTE — INTERDISCIPLINARY/THERAPY
[address] [facility] [address]  [phone]      Physical Therapy Discharge Note    Date of Service:  9/23/2022     Patient Name: Cadence Navarrete  Referring Provider: self referral    (Visit #) 6    Patient did not attend a formal discharge therapy session or has not been seen in the last 30 days.  No objective information able to be obtained and goals not reassessed secondary to not attending a formal discharge therapy session.  Please reference previous daily notes and evaluation for information related to this patients care.      Thank you for allowing us to share in the care of this patient. If you have any questions, recommendations, or further concerns regarding this patient, please feel free to contact us at [address] [place] [id number]  Dept: [phone] .      Signed by: Aggie Arguello, PT 9/23/2022 10:11 AM

## 2022-10-14 ENCOUNTER — OFFICE VISIT (OUTPATIENT)
Dept: DERMATOLOGY | Facility: CLINIC | Age: 42
End: 2022-10-14
Payer: COMMERCIAL

## 2022-10-14 DIAGNOSIS — L20.84 INTRINSIC ECZEMA: ICD-10-CM

## 2022-10-14 DIAGNOSIS — L30.9 NIPPLE DERMATITIS: Primary | ICD-10-CM

## 2022-10-14 PROCEDURE — 99213 OFFICE O/P EST LOW 20 MIN: CPT | Performed by: STUDENT IN AN ORGANIZED HEALTH CARE EDUCATION/TRAINING PROGRAM

## 2022-10-14 RX ORDER — MECLIZINE HYDROCHLORIDE 25 MG/1
25 TABLET ORAL DAILY PRN
COMMUNITY
Start: 2022-09-30

## 2022-10-14 RX ORDER — TRIAMCINOLONE ACETONIDE 1 MG/G
CREAM TOPICAL
Qty: 80 G | Refills: 0 | Status: SHIPPED | OUTPATIENT
Start: 2022-10-14

## 2022-10-14 ASSESSMENT — PAIN SCALES - GENERAL: PAINLEVEL: 0-NO PAIN

## 2022-10-14 ASSESSMENT — ENCOUNTER SYMPTOMS
FATIGUE: 0
BRUISES/BLEEDS EASILY: 0
FEVER: 0

## 2022-10-14 NOTE — PROGRESS NOTES
Subjective   Return Dermatology Skin examination  Cadence Navarrete is a 42 y.o. female with a history of  nipple dermatitis  who presents for a lesion of concern .     Patient was seen in clinic 8/30/22 for Nipple dermatitis but patient was not currently having a flare. It was discussed that she should return if it does flare. Patient states that today the Left areola is flaring and a little on the right. Patient is currently only using daily moisturizer.                     Review of Systems   Constitutional:  Negative for fatigue and fever.   Skin:  Negative for rash.   Allergic/Immunologic: Positive for environmental allergies. Negative for immunocompromised state.   Hematological:  Does not bruise/bleed easily.   All other systems reviewed and are negative.      No past medical history on file.    Current Outpatient Medications on File Prior to Visit   Medication Sig Dispense Refill    meclizine (ANTIVERT) 25 mg tablet Take 25 mg by mouth daily as needed      loratadine (CLARITIN) 10 mg tablet daily      montelukast (SINGULAIR) 10 mg tablet 1 TABLET IN THE EVENING ONCE A DAY AT BEDTIME ORALLY 90 DAYS      multivit-min/iron/folic acid/K (ADULTS MULTIVITAMIN ORAL) daily      albuterol HFA (PROVENTIL HFA;VENTOLIN HFA) 90 mcg/actuation inhaler Inhale 2 puffs every 4 hours by inhalation route as needed.       No current facility-administered medications on file prior to visit.       Allergies  Aspirin, Nsaids (non-steroidal anti-inflammatory drug), and Sulfa (sulfonamide antibiotics)    The following have been reviewed and updated as appropriate in this visit          Physical Examination    Vital Signs  vitals were not taken for this visit.         Physical Exam Findings:     Constitutional: General Appearance: healthy-appearing, well-nourished, and well-developed. Level of Distress: NAD. Ambulation: ambulating normally.  Psychiatric: Insight: good judgement. Mental Status: normal mood and affect and active and  alert. Orientation: to time, place, and person. Memory: recent memory normal and remote memory normal.  Head: normocephalic and atraumatic.  Eyes: Lids and Conjunctivae: no discharge or pallor and non-injected. Lens: clear. Sclerae: non-icteric.  Neurologic: Gait and Station: normal gait and station. Cranial Nerves: grossly intact. Coordination and Cerebellum: no tremor.        A focused exam of bilateral areolas was performed  Faint erythema over bilateral edges of areola with prominent glands.          Procedure Note:        Diagnoses and all orders for this visit:    Nipple dermatitis  -     triamcinolone (KENALOG) 0.1 % cream; Apply to areola's twice daily until clear  - Discussed that minimal findings and bilateral findings that have a history of completely resolving is most consistent with eczema. Patient reports that she is up to date on mammograms and breast screening exams. Topical steroid safety discussed.     Intrinsic eczema  -     triamcinolone (KENALOG) 0.1 % cream; Apply to areola's twice daily until clear  Eczema is a genetic and environmental disease of dry skin that may affect any area. Topical steroids should be applied when the rash is present with redness, itching, scaling, or pain twice a day until resolution, and then moisturizers should be maintained.  Discussed recommendations for gentle skin care.  I recommend that the patient avoid use of any scented lotions, creams, essential oils, soaps or other skin care products.  I recommend the patient use warm water rather than hot, when bathing and use a gentle nonsoap cleanser such as Lever 2000 or Dove bar soap.  Instructed the patient to apply bland thick moisturizer, such as CeraVe or Vanicream, to the whole body following bathing and daily.             I emphasized daily sunscreen use with an SPF of 30-50, broad spectrum and water resistant.  I directed reapplication every two hours.  I recommended wide brimmed hats.  Finally, we discussed  danger signs of changing skin lesions including sores not healing and moles changing in size, shape or color.  Should any of these lesions occur before next appointment, I instructed patent to call sooner for evaluation.    Patient expresses understanding and agreement with the plan of care, and had no further questions at the end of the exam today.     Return if symptoms worsen or fail to improve.

## 2022-11-21 ENCOUNTER — HEALTH MAINTENANCE LETTER (OUTPATIENT)
Age: 42
End: 2022-11-21

## 2023-04-16 ENCOUNTER — HEALTH MAINTENANCE LETTER (OUTPATIENT)
Age: 43
End: 2023-04-16

## 2023-06-02 ENCOUNTER — HEALTH MAINTENANCE LETTER (OUTPATIENT)
Age: 43
End: 2023-06-02

## 2023-11-13 NOTE — PROGRESS NOTES
Subjective   Return Dermatology Skin examination  Cadence Navarrete is a 43 y.o. female with a history of Eczema who presents for a above the waist skin exam . Lesions of concern include: dryness around eyes, patient is using vanicream moisturizer, and will sometimes add Vaseline as well.  Patient  notices that is fluctuates possibly around her menstrual cycle. Patient states that she previously used Cerave but ended up breaking out in hives.       Review of Systems   Constitutional:  Negative for fatigue and fever.   Skin:  Negative for rash.   Allergic/Immunologic: Positive for environmental allergies. Negative for immunocompromised state.   Hematological:  Does not bruise/bleed easily.   All other systems reviewed and are negative.        No past medical history on file.    Current Outpatient Medications on File Prior to Visit   Medication Sig Dispense Refill    hydrOXYzine (ATARAX) 10 mg tablet Take 1 tablet (10 mg total) by mouth 3 times daily as needed      meclizine (ANTIVERT) 25 mg tablet Take 25 mg by mouth daily as needed      triamcinolone (KENALOG) 0.1 % cream Apply to areola's twice daily until clear 80 g 0    loratadine (CLARITIN) 10 mg tablet daily      montelukast (SINGULAIR) 10 mg tablet 1 TABLET IN THE EVENING ONCE A DAY AT BEDTIME ORALLY 90 DAYS      multivit-min/iron/folic acid/K (ADULTS MULTIVITAMIN ORAL) daily      albuterol HFA (PROVENTIL HFA;VENTOLIN HFA) 90 mcg/actuation inhaler Inhale 2 puffs every 4 hours by inhalation route as needed.       No current facility-administered medications on file prior to visit.       Allergies  Aspirin, Nsaids (non-steroidal anti-inflammatory drug), and Sulfa (sulfonamide antibiotics)    The following have been reviewed and updated as appropriate in this visit          Physical Examination    Vital Signs  vitals were not taken for this visit.         Physical Exam Findings:     Constitutional: General Appearance: healthy-appearing, well-nourished, and  well-developed. Level of Distress: NAD. Ambulation: ambulating normally.  Psychiatric: Insight: good judgement. Mental Status: normal mood and affect and active and alert. Orientation: to time, place, and person. Memory: recent memory normal and remote memory normal.  Head: normocephalic and atraumatic.  Eyes: Lids and Conjunctivae: no discharge or pallor and non-injected. Lens: clear. Sclerae: non-icteric.  Neurologic: Gait and Station: normal gait and station. Cranial Nerves: grossly intact. Coordination and Cerebellum: no tremor.           A waist up skin examination was performed including scalp, face, lips, ears, neck, both arms, chest, back, abdomen.    Trunk (including back, chest, abdomen): well demarcated tan brown macules, Bright red smooth papules, medium brown macules with regular borders and pigmentation  Arms/Hands: well demarcated tan brown macules  Scalp: No suspicious lesions noted   Face: erythematous eczematous patches around bilateral eyes, well demarcated tan brown macules              Cadence was seen today for full body skin check.    Diagnoses and all orders for this visit:    Eczematous dermatitis of upper and lower eyelids of both eyes  -     hydrocortisone 2.5 % ointment; Apply topically 2 (two) times a day To eye area as needed  Eczema is a genetic and environmental disease of dry skin that may affect any area. Topical steroids should be applied when the rash is present with redness, itching, scaling, or pain twice a day until resolution, and then moisturizers should be maintained.  Discussed recommendations for gentle skin care.  I recommend that the patient avoid use of any scented lotions, creams, essential oils, soaps or other skin care products.  I recommend the patient use warm water rather than hot, when bathing and use a gentle nonsoap cleanser such as Lever 2000 or Dove bar soap.  Instructed the patient to apply bland thick moisturizer, such as CeraVe or Vanicream, to the whole  body following bathing and daily.       Lentigines  Discussed benign nature of lentigos, informed patient they are sun-induced brown flat lesions. Recommend diligent sun protection.     Simon hemangioma   Discussed benign nature of cherry angiomas in detail inform patient these are genetic and hormonally induced vascular growths with no potential for malignant transformation    Multiple benign nevi  Reassurance given for benign appearing nevi today. Patient is to watch for any changes that would suggest atypia such as itching, bleeding, changing irregular shape, increased diameter or irregularity, and multiple colors in moles and contact our office if any such changes occur.               I emphasized daily sunscreen use with an SPF of 30-50, broad spectrum and water resistant.  I directed reapplication every two hours.  I recommended wide brimmed hats.  Finally, we discussed danger signs of changing skin lesions including sores not healing and moles changing in size, shape or color.  Should any of these lesions occur before next appointment, I instructed patent to call sooner for evaluation.    Patient expresses understanding and agreement with the plan of care, and had no further questions at the end of the exam today.     Return in about 1 year (around 12/11/2024) for Skin Check.

## 2023-12-11 ENCOUNTER — OFFICE VISIT (OUTPATIENT)
Dept: DERMATOLOGY | Facility: CLINIC | Age: 43
End: 2023-12-11
Payer: COMMERCIAL

## 2023-12-11 DIAGNOSIS — D18.01 CHERRY HEMANGIOMA: ICD-10-CM

## 2023-12-11 DIAGNOSIS — D22.9 MULTIPLE BENIGN NEVI: ICD-10-CM

## 2023-12-11 DIAGNOSIS — H01.135 ECZEMATOUS DERMATITIS OF UPPER AND LOWER EYELIDS OF BOTH EYES: Primary | ICD-10-CM

## 2023-12-11 DIAGNOSIS — H01.134 ECZEMATOUS DERMATITIS OF UPPER AND LOWER EYELIDS OF BOTH EYES: Primary | ICD-10-CM

## 2023-12-11 DIAGNOSIS — L81.4 LENTIGINES: ICD-10-CM

## 2023-12-11 DIAGNOSIS — H01.132 ECZEMATOUS DERMATITIS OF UPPER AND LOWER EYELIDS OF BOTH EYES: Primary | ICD-10-CM

## 2023-12-11 DIAGNOSIS — H01.131 ECZEMATOUS DERMATITIS OF UPPER AND LOWER EYELIDS OF BOTH EYES: Primary | ICD-10-CM

## 2023-12-11 PROCEDURE — 99213 OFFICE O/P EST LOW 20 MIN: CPT | Performed by: STUDENT IN AN ORGANIZED HEALTH CARE EDUCATION/TRAINING PROGRAM

## 2023-12-11 RX ORDER — HYDROXYZINE HYDROCHLORIDE 10 MG/1
10 TABLET, FILM COATED ORAL 3 TIMES DAILY PRN
COMMUNITY

## 2023-12-11 RX ORDER — HYDROCORTISONE 25 MG/G
OINTMENT TOPICAL 2 TIMES DAILY
Qty: 80 G | Refills: 2 | Status: SHIPPED | OUTPATIENT
Start: 2023-12-11 | End: 2024-12-10

## 2023-12-11 ASSESSMENT — ENCOUNTER SYMPTOMS
FEVER: 0
FATIGUE: 0
BRUISES/BLEEDS EASILY: 0

## 2023-12-11 ASSESSMENT — PAIN SCALES - GENERAL: PAINLEVEL: 0-NO PAIN

## 2024-11-20 NOTE — PROGRESS NOTES
Subjective   Return Dermatology Skin examination  Cadence Navarrete is a 44 y.o. female with a history of  eczematous dermatitis of the eyelids   who presents for a full body skin exam . Lesions of concern include: none         Review of Systems   Constitutional:  Negative for fatigue and fever.   Skin:  Negative for rash.   Allergic/Immunologic: Positive for environmental allergies. Negative for immunocompromised state.   Hematological:  Does not bruise/bleed easily.   All other systems reviewed and are negative.      No past medical history on file.    Current Outpatient Medications on File Prior to Visit   Medication Sig Dispense Refill    estradioL (ESTRACE) 1 mg tablet       phentermine (ADIPEX-P) 37.5 mg tablet       hydrOXYzine (ATARAX) 10 mg tablet Take 1 tablet (10 mg total) by mouth 3 times daily as needed      hydrocortisone 2.5 % ointment Apply topically 2 (two) times a day To eye area as needed 80 g 2    meclizine (ANTIVERT) 25 mg tablet Take 25 mg by mouth daily as needed      triamcinolone (KENALOG) 0.1 % cream Apply to areola's twice daily until clear 80 g 0    loratadine (CLARITIN) 10 mg tablet daily      montelukast (SINGULAIR) 10 mg tablet 1 TABLET IN THE EVENING ONCE A DAY AT BEDTIME ORALLY 90 DAYS      multivit-min/iron/folic acid/K (ADULTS MULTIVITAMIN ORAL) daily      albuterol HFA (PROVENTIL HFA;VENTOLIN HFA) 90 mcg/actuation inhaler Inhale 2 puffs every 4 hours by inhalation route as needed.       No current facility-administered medications on file prior to visit.       Allergies  Aspirin, Nsaids (non-steroidal anti-inflammatory drug), Sulfa (sulfonamide antibiotics), and Rabbit    The following have been reviewed and updated as appropriate in this visit          Physical Examination    Vital Signs  blood pressure is 122/85 and her pulse is 99. Her oxygen saturation is 98%.         Physical Exam Findings:     Constitutional: General Appearance: healthy-appearing, well-nourished, and  well-developed. Level of Distress: NAD.       A skin examination was performed of the following sites:    Trunk (including back, chest, abdomen):Bright red smooth papules, well demarcated tan brown macules, medium brown macules and papules with regular borders and pigmentation  Arms/Hands: well demarcated tan brown macules  Buttocks: No suspicious lesions noted  Groin:No suspicious lesions noted  Legs:well demarcated tan brown macules, Bright red smooth papules, brown macules and papules with regular borders and pigmentation  Feet: No suspicious lesions noted  Scalp: No suspicious lesions noted   Face: well demarcated tan brown macules           Cadence was seen today for full body skin check.    Diagnoses and all orders for this visit:    Cherry hemangioma   Discussed benign nature of cherry angiomas in detail inform patient these are genetic and hormonally induced vascular growths with no potential for malignant transformation    Lentigines  Discussed benign nature of lentigos, informed patient they are sun-induced brown flat lesions. Recommend diligent sun protection.     Multiple benign nevi  Reassurance given for benign appearing nevi today. Patient is to watch for any changes that would suggest atypia such as itching, bleeding, changing irregular shape, increased diameter or irregularity, and multiple colors in moles and contact our office if any such changes occur.         I emphasized daily sunscreen use with an SPF of 30-50, broad spectrum and water resistant.  I directed reapplication every two hours.  I recommended wide brimmed hats.  Finally, we discussed danger signs of changing skin lesions including sores not healing and moles changing in size, shape or color.  Should any of these lesions occur before next appointment, I instructed patent to call sooner for evaluation.    Patient expresses understanding and agreement with the plan of care, and had no further questions at the end of the exam today.      Return in about 1 year (around 12/13/2025).

## 2024-11-20 NOTE — PATIENT INSTRUCTIONS
"  Avoid sunlight between the hours of 10 am and 2 pm, wear a broad spectrum, water resistant sunscreen with SPF of 30-50 year round. Reapply sunscreen every 2 hours and after exercising or swimming. Wearing a 6\" broad brimmed hat, a lip sunblock of SPF of 30-50, and sun protective clothing is also suggested year round.   Recommended sunscreens include Neutrogena Ultra Sheer Dry Touch SPF 50 or higher, Neutrogena Sheer Zinc Dry Touch SPF 50, and Vanicream Sport.  ---------------------------------------------------------------------  Monthly self-examination of your skin is important. Should any lesions, including moles, change in size, shape, color, itch, bleed or burn, contact our office for sooner evaluation.  ----------------------------------------------------------------------  **We are always looking for opportunities to improve your care and patient experience. You will be receiving a survey about your care via text or e-mail. If you come across questions in the survey that are not applicable to your visit, please leave these questions blank. Your satisfaction is important to us so please be honest. Your identity is kept confidential. Thank you!**   "

## 2024-12-13 ENCOUNTER — OFFICE VISIT (OUTPATIENT)
Dept: DERMATOLOGY | Facility: CLINIC | Age: 44
End: 2024-12-13
Payer: COMMERCIAL

## 2024-12-13 VITALS — OXYGEN SATURATION: 98 % | HEART RATE: 99 BPM | SYSTOLIC BLOOD PRESSURE: 122 MMHG | DIASTOLIC BLOOD PRESSURE: 85 MMHG

## 2024-12-13 DIAGNOSIS — L81.4 LENTIGINES: ICD-10-CM

## 2024-12-13 DIAGNOSIS — D18.01 CHERRY HEMANGIOMA: Primary | ICD-10-CM

## 2024-12-13 DIAGNOSIS — D22.9 MULTIPLE BENIGN NEVI: ICD-10-CM

## 2024-12-13 PROCEDURE — 99213 OFFICE O/P EST LOW 20 MIN: CPT | Performed by: STUDENT IN AN ORGANIZED HEALTH CARE EDUCATION/TRAINING PROGRAM

## 2024-12-13 RX ORDER — PHENTERMINE HYDROCHLORIDE 37.5 MG/1
TABLET ORAL
COMMUNITY
Start: 2024-10-19

## 2024-12-13 RX ORDER — ESTRADIOL 1 MG/1
TABLET ORAL
COMMUNITY
Start: 2024-05-07

## 2024-12-13 ASSESSMENT — ENCOUNTER SYMPTOMS
FEVER: 0
FATIGUE: 0
BRUISES/BLEEDS EASILY: 0

## 2025-01-09 ENCOUNTER — TRANSCRIBE ORDERS (OUTPATIENT)
Dept: DIABETES SERVICES | Facility: CLINIC | Age: 45
End: 2025-01-09

## 2025-01-09 DIAGNOSIS — E66.811 CLASS 1 OBESITY: ICD-10-CM

## 2025-01-29 ENCOUNTER — TRANSCRIBE ORDERS (OUTPATIENT)
Dept: DIABETES SERVICES | Facility: CLINIC | Age: 45
End: 2025-01-29

## 2025-01-29 DIAGNOSIS — E66.811 CLASS 1 OBESITY: Primary | ICD-10-CM
